# Patient Record
Sex: MALE | Race: WHITE | NOT HISPANIC OR LATINO | Employment: FULL TIME | URBAN - NONMETROPOLITAN AREA
[De-identification: names, ages, dates, MRNs, and addresses within clinical notes are randomized per-mention and may not be internally consistent; named-entity substitution may affect disease eponyms.]

---

## 2023-06-09 DIAGNOSIS — I10 ESSENTIAL (PRIMARY) HYPERTENSION: ICD-10-CM

## 2023-06-09 RX ORDER — METOPROLOL TARTRATE 25 MG/1
TABLET, FILM COATED ORAL
Qty: 180 TABLET | Refills: 1 | Status: SHIPPED | OUTPATIENT
Start: 2023-06-09 | End: 2023-12-27

## 2023-10-19 DIAGNOSIS — I10 ESSENTIAL (PRIMARY) HYPERTENSION: ICD-10-CM

## 2023-10-19 DIAGNOSIS — E78.5 HYPERLIPIDEMIA, UNSPECIFIED: ICD-10-CM

## 2023-10-19 RX ORDER — LOSARTAN POTASSIUM 25 MG/1
25 TABLET ORAL DAILY
Qty: 90 TABLET | Refills: 3 | Status: SHIPPED | OUTPATIENT
Start: 2023-10-19

## 2023-10-19 RX ORDER — PRAVASTATIN SODIUM 40 MG/1
40 TABLET ORAL DAILY
Qty: 90 TABLET | Refills: 3 | Status: SHIPPED | OUTPATIENT
Start: 2023-10-19

## 2023-12-27 DIAGNOSIS — I10 ESSENTIAL (PRIMARY) HYPERTENSION: ICD-10-CM

## 2023-12-27 RX ORDER — METOPROLOL TARTRATE 25 MG/1
TABLET, FILM COATED ORAL
Qty: 180 TABLET | Refills: 1 | Status: SHIPPED | OUTPATIENT
Start: 2023-12-27

## 2024-10-25 DIAGNOSIS — E78.5 HYPERLIPIDEMIA, UNSPECIFIED: ICD-10-CM

## 2024-10-25 DIAGNOSIS — I10 ESSENTIAL (PRIMARY) HYPERTENSION: ICD-10-CM

## 2024-10-25 PROBLEM — I47.29 NSVT (NONSUSTAINED VENTRICULAR TACHYCARDIA) (MULTI): Status: ACTIVE | Noted: 2024-10-25

## 2024-10-25 PROBLEM — Z98.890 S/P VENTRICULAR SEPTAL MYECTOMY: Status: ACTIVE | Noted: 2024-10-25

## 2024-10-25 PROBLEM — I42.2 HYPERTROPHIC CARDIOMYOPATHY (MULTI): Status: ACTIVE | Noted: 2024-10-25

## 2024-10-25 PROBLEM — I48.92 ATRIAL FLUTTER (MULTI): Status: ACTIVE | Noted: 2024-10-25

## 2024-10-25 PROBLEM — E87.6 HYPOKALEMIA: Status: ACTIVE | Noted: 2024-10-25

## 2024-10-25 PROBLEM — R09.89 BRUIT: Status: ACTIVE | Noted: 2024-10-25

## 2024-10-25 PROBLEM — I87.2 CHRONIC VENOUS INSUFFICIENCY: Status: ACTIVE | Noted: 2024-10-25

## 2024-10-25 PROBLEM — J01.90 ACUTE SINUSITIS: Status: ACTIVE | Noted: 2024-10-25

## 2024-10-25 PROBLEM — I48.0 PAROXYSMAL ATRIAL FIBRILLATION (MULTI): Status: ACTIVE | Noted: 2024-10-25

## 2024-10-25 PROBLEM — E83.41 HYPERMAGNESEMIA: Status: ACTIVE | Noted: 2024-10-25

## 2024-10-25 RX ORDER — PRAVASTATIN SODIUM 40 MG/1
40 TABLET ORAL DAILY
Qty: 90 TABLET | Refills: 3 | OUTPATIENT
Start: 2024-10-25

## 2024-10-25 RX ORDER — LOSARTAN POTASSIUM 25 MG/1
25 TABLET ORAL DAILY
Qty: 90 TABLET | Refills: 3 | OUTPATIENT
Start: 2024-10-25

## 2024-10-25 NOTE — PROGRESS NOTES
"Subjective   Patient ID: Lion Petty is a 59 y.o. male who presents for Follow-up (General check up).    HPI    Lion is here today for medication refills. He has no concerns today.       Aflutter, CAD: s/p Maze (2017) and ablation (2019). He had the Maze procedure 3y ago. Not on blood thinners. He has never had an MI. No issues with palpitations, heart racing. He has some family history. Previously seeing cardiology twice per year, he is not following since Benatti left.      HTN: He is on losartan and metoprolol, no issues. He gets ODOT physicals yearly in May, they are watching his blood pressure.     HLD: On pravastatin, no SE's.      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 He was born in [facility] but grew up in [place]. He has to wake up at 1am everyday for work. Planning on 2 camping vacations this summer.     All other systems have been reviewed and are negative for complain    Current Outpatient Medications:     losartan (Cozaar) 25 mg tablet, TAKE 1 TABLET BY MOUTH EVERY DAY, Disp: 90 tablet, Rfl: 3    metoprolol tartrate (Lopressor) 25 mg tablet, TAKE 1 TABLET BY MOUTH TWICE A DAY, Disp: 180 tablet, Rfl: 1    pravastatin (Pravachol) 40 mg tablet, TAKE 1 TABLET BY MOUTH EVERY DAY, Disp: 90 tablet, Rfl: 3        /81 (BP Location: Right arm)   Pulse 69   Ht 1.753 m (5' 9\")   Wt 118 kg (260 lb 14.4 oz)   BMI 38.53 kg/m²      Objective     Physical Exam    Gen: No acute distress, alert and oriented x3, pleasant   HEENT: moist mucous membranes, b/l external auditory canals are clear of debris, TMs within normal limits, no oropharyngeal lesions, eomi, perrla   Neck: thyroid within normal limits, no lymphadenopathy   CV: Murmur noted,  RRR, normal S1/S2,   Resp: Clear to auscultation bilaterally, no wheezes or rhonchi appreciated  Abd: soft, nontender, non-distended, no guarding/rigidity, bowel sounds present  Extr: no edema, no calf tenderness  Derm: Skin is warm " and dry, no rashes appreciated  Psych: mood is good, affect is congruent, good hygiene, normal speech and eye contact  Neuro: cranial nerves grossly intact, normal gait      Assessment/Plan         #Murmur        Referral to cardiology    #HLD  Lipid panel order  Controlled on statin     #Hypokalemia  Resolved     #Hypertrophic cardiomyopathy  s/p myomectomy      #Aflutter  s/p MAZE procedure  on metoprolol      #HTN  Well controlled on losartan and metoprolol     Health Maintenance  Colonoscopy ordered- Last one 2020 showed polyps, next due was 2023- Over due  Lipid, CMP, CBC, and TSH ordered

## 2024-10-30 ENCOUNTER — APPOINTMENT (OUTPATIENT)
Dept: PRIMARY CARE | Facility: CLINIC | Age: 59
End: 2024-10-30
Payer: COMMERCIAL

## 2024-10-30 VITALS
HEIGHT: 69 IN | HEART RATE: 69 BPM | DIASTOLIC BLOOD PRESSURE: 81 MMHG | BODY MASS INDEX: 38.64 KG/M2 | SYSTOLIC BLOOD PRESSURE: 128 MMHG | WEIGHT: 260.9 LBS

## 2024-10-30 DIAGNOSIS — I48.92 ATRIAL FLUTTER, UNSPECIFIED TYPE (MULTI): ICD-10-CM

## 2024-10-30 DIAGNOSIS — I42.2 HYPERTROPHIC CARDIOMYOPATHY (MULTI): ICD-10-CM

## 2024-10-30 DIAGNOSIS — Z12.11 ENCOUNTER FOR SCREENING FOR MALIGNANT NEOPLASM OF COLON: ICD-10-CM

## 2024-10-30 DIAGNOSIS — R01.1 MURMUR, CARDIAC: Primary | ICD-10-CM

## 2024-10-30 DIAGNOSIS — Z00.00 ROUTINE MEDICAL EXAM: ICD-10-CM

## 2024-10-30 DIAGNOSIS — I10 ESSENTIAL (PRIMARY) HYPERTENSION: ICD-10-CM

## 2024-10-30 DIAGNOSIS — E78.5 HYPERLIPIDEMIA, UNSPECIFIED: ICD-10-CM

## 2024-10-30 DIAGNOSIS — Z00.00 BLOOD TESTS FOR ROUTINE GENERAL PHYSICAL EXAMINATION: ICD-10-CM

## 2024-10-30 PROCEDURE — 3008F BODY MASS INDEX DOCD: CPT

## 2024-10-30 PROCEDURE — 99213 OFFICE O/P EST LOW 20 MIN: CPT

## 2024-10-30 PROCEDURE — 3079F DIAST BP 80-89 MM HG: CPT

## 2024-10-30 PROCEDURE — 3074F SYST BP LT 130 MM HG: CPT

## 2024-10-30 RX ORDER — METOPROLOL TARTRATE 25 MG/1
25 TABLET, FILM COATED ORAL 2 TIMES DAILY
Qty: 180 TABLET | Refills: 1 | Status: SHIPPED | OUTPATIENT
Start: 2024-10-30

## 2024-10-30 RX ORDER — PRAVASTATIN SODIUM 40 MG/1
40 TABLET ORAL DAILY
Qty: 90 TABLET | Refills: 3 | Status: SHIPPED | OUTPATIENT
Start: 2024-10-30

## 2024-10-30 RX ORDER — LOSARTAN POTASSIUM 25 MG/1
25 TABLET ORAL DAILY
Qty: 90 TABLET | Refills: 3 | Status: SHIPPED | OUTPATIENT
Start: 2024-10-30

## 2024-10-30 NOTE — PATIENT INSTRUCTIONS
Cardiology:   Donta Alcazar () [phone]  Diane Mccracken () [phone]  Lizeth Giang () [phone]  Mahesh Gross () [phone]  Adam Del Rio () [phone]  Nader Whiting () [phone]  Latanya Chow ([facility]) [phone]

## 2025-01-15 ENCOUNTER — TELEMEDICINE (OUTPATIENT)
Dept: CARDIOLOGY | Facility: CLINIC | Age: 60
End: 2025-01-15
Payer: COMMERCIAL

## 2025-01-15 ENCOUNTER — APPOINTMENT (OUTPATIENT)
Dept: CARDIOLOGY | Facility: CLINIC | Age: 60
End: 2025-01-15
Payer: COMMERCIAL

## 2025-01-15 DIAGNOSIS — Z98.890 S/P VENTRICULAR SEPTAL MYECTOMY: ICD-10-CM

## 2025-01-15 DIAGNOSIS — I48.0 PAROXYSMAL ATRIAL FIBRILLATION (MULTI): ICD-10-CM

## 2025-01-15 DIAGNOSIS — I42.2 HYPERTROPHIC CARDIOMYOPATHY (MULTI): ICD-10-CM

## 2025-01-15 DIAGNOSIS — I10 PRIMARY HYPERTENSION: ICD-10-CM

## 2025-01-15 DIAGNOSIS — E78.5 HYPERLIPIDEMIA, UNSPECIFIED HYPERLIPIDEMIA TYPE: Primary | ICD-10-CM

## 2025-01-15 PROCEDURE — 99204 OFFICE O/P NEW MOD 45 MIN: CPT | Performed by: INTERNAL MEDICINE

## 2025-01-16 DIAGNOSIS — I48.92 ATRIAL FLUTTER, UNSPECIFIED TYPE (MULTI): ICD-10-CM

## 2025-01-16 NOTE — PROGRESS NOTES
Primary Care Physician: Becky Kiser DO      Date of Visit: 01/15/2025 10:00 AM EST  Location of visit:  [facility]   Type of Visit: New Patient       DIAGNOSES: HOCM S/p Septal Myectomy+Maze procedure (3/2016- Dr Love). NYHA Class II. Euvolemic state. HTN. DLD.      HPI / Summary:   Lion Petty is a 59 y.o. male who presents to establish cardiac care.  He has extensive past history of cardiac ailments, hypertrophic obstructive cardiomyopathy with the paroxysmal atrial fibrillation having undergone septal myectomy with maze procedure.  He has been doing well and active since the procedure without any cardiac complaints.  He lives with his wife.  There is no family history of hypertrophic cardiomyopathy or sudden death.         12 system review is negative except as noted above      Medical History:   Past Medical History:   Diagnosis Date    Other specified health status 08/26/2015    No known problems       Surgical History:   Past Surgical History:   Procedure Laterality Date    OTHER SURGICAL HISTORY  04/11/2016    Myocardial Myectomy    OTHER SURGICAL HISTORY  04/11/2016    Maze Procedure       Family History:   No family history on file.    Social History:   Tobacco Use: Medium Risk (10/30/2024)    Patient History     Smoking Tobacco Use: Former     Smokeless Tobacco Use: Unknown     Passive Exposure: Not on file             MEDICATIONS:   Current Outpatient Medications   Medication Instructions    losartan (COZAAR) 25 mg, oral, Daily    metoprolol tartrate (LOPRESSOR) 25 mg, oral, 2 times daily    pravastatin (PRAVACHOL) 40 mg, oral, Daily       LABS:  CBC:   Lab Results   Component Value Date    WBC 6.4 12/22/2021    RBC 5.11 12/22/2021    HGB 14.4 12/22/2021    HCT 43.7 12/22/2021    MCV 86 12/22/2021    MCHC 33.0 12/22/2021    RDW 12.9 12/22/2021     12/22/2021     CBC with Differential:    Lab Results   Component Value Date    WBC 6.4 12/22/2021    RBC 5.11 12/22/2021    HGB 14.4  "12/22/2021    HCT 43.7 12/22/2021     12/22/2021    MCV 86 12/22/2021    MCHC 33.0 12/22/2021    RDW 12.9 12/22/2021    LYMPHOPCT 35.8 12/22/2021    MONOPCT 9.5 12/22/2021    EOSPCT 1.9 12/22/2021    BASOPCT 0.5 12/22/2021    MONOSABS 0.61 12/22/2021    LYMPHSABS 2.29 12/22/2021    EOSABS 0.12 12/22/2021    BASOSABS 0.03 12/22/2021     CMP:    Lab Results   Component Value Date     12/22/2021    K 4.4 12/22/2021     12/22/2021    CO2 30 12/22/2021    BUN 17 12/22/2021    CREATININE 0.97 12/22/2021    GLUCOSE 117 (H) 12/22/2021    PROT 7.2 12/22/2021    CALCIUM 9.5 12/22/2021    BILITOT 0.5 12/22/2021    ALKPHOS 60 12/22/2021    AST 20 12/22/2021    ALT 33 12/22/2021     BMP:    Lab Results   Component Value Date     12/22/2021    K 4.4 12/22/2021     12/22/2021    CO2 30 12/22/2021    BUN 17 12/22/2021    CREATININE 0.97 12/22/2021    CALCIUM 9.5 12/22/2021    GLUCOSE 117 (H) 12/22/2021     Magnesium:  Lab Results   Component Value Date    MG 2.15 11/06/2018     Troponin:  No results found for: \"TROPHS\"  BNP:   Lab Results   Component Value Date     (H) 11/06/2018       Lipid Panel:  Lab Results   Component Value Date    HDL 40.6 12/22/2021    CHHDL 3.5 12/22/2021    VLDL 19 12/22/2021    TRIG 95 12/22/2021        Lab work and imaging results independently reviewed by me         Visit Vitals  Smoking Status Former         DIAGNOSES: HOCM S/p Septal Myectomy+Maze procedure (3/2016- Dr Love). NYHA Class II. Euvolemic state. HTN. DLD.    I am pleased to see that Mr. Madden has been doing well from a cardiac perspective.  I advised him to continue with current medications as prescribed and regular follow-up with you.  I have requested an echocardiogram and an event monitor.    Thank you so much for the opportunity to participate in the care of this very pleasant gentleman. Please do not hesitate to contact me if I could be of any assistance in his future " care.    Sincerely    Varun Mccracken M.D.      01/15/25 at 11:14 PM - Diane Mccracken MD        Followup Appts:  No future appointments.

## 2025-03-18 DIAGNOSIS — E78.5 HYPERLIPIDEMIA, UNSPECIFIED: ICD-10-CM

## 2025-03-18 DIAGNOSIS — I10 ESSENTIAL (PRIMARY) HYPERTENSION: ICD-10-CM

## 2025-03-18 RX ORDER — LOSARTAN POTASSIUM 25 MG/1
25 TABLET ORAL DAILY
Qty: 90 TABLET | Refills: 3 | Status: SHIPPED | OUTPATIENT
Start: 2025-03-18

## 2025-03-18 RX ORDER — METOPROLOL TARTRATE 25 MG/1
25 TABLET, FILM COATED ORAL 2 TIMES DAILY
Qty: 180 TABLET | Refills: 1 | Status: SHIPPED | OUTPATIENT
Start: 2025-03-18

## 2025-03-18 RX ORDER — PRAVASTATIN SODIUM 40 MG/1
40 TABLET ORAL DAILY
Qty: 90 TABLET | Refills: 3 | Status: SHIPPED | OUTPATIENT
Start: 2025-03-18